# Patient Record
Sex: FEMALE | Race: WHITE
[De-identification: names, ages, dates, MRNs, and addresses within clinical notes are randomized per-mention and may not be internally consistent; named-entity substitution may affect disease eponyms.]

---

## 2020-08-16 ENCOUNTER — HOSPITAL ENCOUNTER (EMERGENCY)
Dept: HOSPITAL 26 - MED | Age: 40
Discharge: HOME | End: 2020-08-16
Payer: COMMERCIAL

## 2020-08-16 VITALS — SYSTOLIC BLOOD PRESSURE: 133 MMHG | DIASTOLIC BLOOD PRESSURE: 77 MMHG

## 2020-08-16 VITALS — DIASTOLIC BLOOD PRESSURE: 77 MMHG | SYSTOLIC BLOOD PRESSURE: 113 MMHG

## 2020-08-16 VITALS — BODY MASS INDEX: 21.98 KG/M2 | HEIGHT: 68 IN | WEIGHT: 145 LBS

## 2020-08-16 DIAGNOSIS — R06.02: Primary | ICD-10-CM

## 2020-08-16 DIAGNOSIS — R20.0: ICD-10-CM

## 2020-08-16 DIAGNOSIS — N64.4: ICD-10-CM

## 2020-08-16 LAB
ALBUMIN FLD-MCNC: 3.8 G/DL (ref 3.4–5)
ANION GAP SERPL CALCULATED.3IONS-SCNC: 15.5 MMOL/L (ref 8–16)
AST SERPL-CCNC: 10 U/L (ref 15–37)
BARBITURATES UR QL SCN: NEGATIVE NG/ML
BASOPHILS # BLD AUTO: 0.1 K/UL (ref 0–0.22)
BASOPHILS NFR BLD AUTO: 0.8 % (ref 0–2)
BENZODIAZ UR QL SCN: NEGATIVE NG/ML
BILIRUB SERPL-MCNC: 1.5 MG/DL (ref 0–1)
BUN SERPL-MCNC: 7 MG/DL (ref 7–18)
BZE UR QL SCN: NEGATIVE NG/ML
CANNABINOIDS UR QL SCN: NEGATIVE NG/ML
CHLORIDE SERPL-SCNC: 101 MMOL/L (ref 98–107)
CO2 SERPL-SCNC: 25.1 MMOL/L (ref 21–32)
CREAT SERPL-MCNC: 0.7 MG/DL (ref 0.6–1.3)
EOSINOPHIL # BLD AUTO: 0 K/UL (ref 0–0.4)
EOSINOPHIL NFR BLD AUTO: 0.3 % (ref 0–4)
ERYTHROCYTE [DISTWIDTH] IN BLOOD BY AUTOMATED COUNT: 14.9 % (ref 11.6–13.7)
GFR SERPL CREATININE-BSD FRML MDRD: 120 ML/MIN (ref 90–?)
GLUCOSE SERPL-MCNC: 94 MG/DL (ref 74–106)
HCT VFR BLD AUTO: 42.1 % (ref 36–48)
HGB BLD-MCNC: 13.6 G/DL (ref 12–16)
KETONES TITR SERPL: NEGATIVE {TITER}
LIPASE SERPL-CCNC: 228 U/L (ref 73–393)
LYMPHOCYTES # BLD AUTO: 2.2 K/UL (ref 2.5–16.5)
LYMPHOCYTES NFR BLD AUTO: 21.2 % (ref 20.5–51.1)
MCH RBC QN AUTO: 28 PG (ref 27–31)
MCHC RBC AUTO-ENTMCNC: 32 G/DL (ref 33–37)
MCV RBC AUTO: 85.8 FL (ref 80–94)
MONOCYTES # BLD AUTO: 0.9 K/UL (ref 0.8–1)
MONOCYTES NFR BLD AUTO: 8.3 % (ref 1.7–9.3)
NEUTROPHILS # BLD AUTO: 7.2 K/UL (ref 1.8–7.7)
NEUTROPHILS NFR BLD AUTO: 69.4 % (ref 42.2–75.2)
OPIATES UR QL SCN: NEGATIVE NG/ML
PCP UR QL SCN: NEGATIVE NG/ML
PLATELET # BLD AUTO: 215 K/UL (ref 140–450)
POTASSIUM SERPL-SCNC: 3.6 MMOL/L (ref 3.5–5.1)
RBC # BLD AUTO: 4.91 MIL/UL (ref 4.2–5.4)
SODIUM SERPL-SCNC: 138 MMOL/L (ref 136–145)
TSH SERPL DL<=0.05 MIU/L-ACNC: 0.57 UIU/ML (ref 0.34–3.74)
WBC # BLD AUTO: 10.4 K/UL (ref 4.8–10.8)

## 2020-08-16 PROCEDURE — 85379 FIBRIN DEGRADATION QUANT: CPT

## 2020-08-16 PROCEDURE — 81002 URINALYSIS NONAUTO W/O SCOPE: CPT

## 2020-08-16 PROCEDURE — 83690 ASSAY OF LIPASE: CPT

## 2020-08-16 PROCEDURE — 81025 URINE PREGNANCY TEST: CPT

## 2020-08-16 PROCEDURE — 71250 CT THORAX DX C-: CPT

## 2020-08-16 PROCEDURE — 80053 COMPREHEN METABOLIC PANEL: CPT

## 2020-08-16 PROCEDURE — 74176 CT ABD & PELVIS W/O CONTRAST: CPT

## 2020-08-16 PROCEDURE — 84484 ASSAY OF TROPONIN QUANT: CPT

## 2020-08-16 PROCEDURE — 82803 BLOOD GASES ANY COMBINATION: CPT

## 2020-08-16 PROCEDURE — 82009 KETONE BODYS QUAL: CPT

## 2020-08-16 PROCEDURE — 84443 ASSAY THYROID STIM HORMONE: CPT

## 2020-08-16 PROCEDURE — 36600 WITHDRAWAL OF ARTERIAL BLOOD: CPT

## 2020-08-16 PROCEDURE — 85025 COMPLETE CBC W/AUTO DIFF WBC: CPT

## 2020-08-16 PROCEDURE — 80305 DRUG TEST PRSMV DIR OPT OBS: CPT

## 2020-08-16 PROCEDURE — 96360 HYDRATION IV INFUSION INIT: CPT

## 2020-08-16 PROCEDURE — 36415 COLL VENOUS BLD VENIPUNCTURE: CPT

## 2020-08-16 PROCEDURE — 99285 EMERGENCY DEPT VISIT HI MDM: CPT

## 2020-08-16 NOTE — NUR
39 Y/O FEMALE C/O GEN WEAKNESS/ FATIGUE/ N/V/D/ SOB/CHEST PAIN X1 MONTH. PT 
STATES SHE HAS TESTED NEGATIVE FOR COVID 4 TIMES, AND HER PCP IS NOT TAKING 
APPOINTMENTS. RESP EVEN AND UNLABORED. SP02 97% RA. NO DISTRESS NOTED AT THIS 
TIME. SKIN WARM/DRY. AAOX4. CAP REFILL <3. PT STATES SHE HAS HAD SIGNIFICANT 
AMOUNT OF WEIGHTLOSS THIS MONTH DUE TO LOSS OF APPETITE. AMBULATORY WITH STEADY 
GAIT. 

DENIES PMH

NKA

## 2020-08-20 NOTE — NUR
Covid results received from lab. Results = NOT DETECTED. Hard copy requested 
from lab and placed in infection controls mailbox.

## 2020-10-19 ENCOUNTER — HOSPITAL ENCOUNTER (EMERGENCY)
Dept: HOSPITAL 26 - MED | Age: 40
Discharge: HOME | End: 2020-10-19
Payer: COMMERCIAL

## 2020-10-19 VITALS — WEIGHT: 170 LBS | HEIGHT: 68 IN | BODY MASS INDEX: 25.76 KG/M2

## 2020-10-19 VITALS — DIASTOLIC BLOOD PRESSURE: 87 MMHG | SYSTOLIC BLOOD PRESSURE: 128 MMHG

## 2020-10-19 VITALS — DIASTOLIC BLOOD PRESSURE: 94 MMHG | SYSTOLIC BLOOD PRESSURE: 131 MMHG

## 2020-10-19 DIAGNOSIS — R42: Primary | ICD-10-CM

## 2020-10-19 DIAGNOSIS — E03.9: ICD-10-CM

## 2020-10-19 DIAGNOSIS — E87.6: ICD-10-CM

## 2020-10-19 DIAGNOSIS — R53.82: ICD-10-CM

## 2020-10-19 LAB
ALBUMIN FLD-MCNC: 3.4 G/DL (ref 3.4–5)
ANION GAP SERPL CALCULATED.3IONS-SCNC: 13.7 MMOL/L (ref 8–16)
AST SERPL-CCNC: 19 U/L (ref 15–37)
BASOPHILS # BLD AUTO: 0.1 K/UL (ref 0–0.22)
BASOPHILS NFR BLD AUTO: 0.7 % (ref 0–2)
BILIRUB SERPL-MCNC: 0.7 MG/DL (ref 0–1)
BUN SERPL-MCNC: 6 MG/DL (ref 7–18)
CHLORIDE SERPL-SCNC: 103 MMOL/L (ref 98–107)
CO2 SERPL-SCNC: 25.7 MMOL/L (ref 21–32)
CREAT SERPL-MCNC: 0.6 MG/DL (ref 0.6–1.3)
EOSINOPHIL # BLD AUTO: 0 K/UL (ref 0–0.4)
EOSINOPHIL NFR BLD AUTO: 0.3 % (ref 0–4)
ERYTHROCYTE [DISTWIDTH] IN BLOOD BY AUTOMATED COUNT: 15.4 % (ref 11.6–13.7)
GFR SERPL CREATININE-BSD FRML MDRD: 143 ML/MIN (ref 90–?)
GLUCOSE SERPL-MCNC: 108 MG/DL (ref 74–106)
HCT VFR BLD AUTO: 39.1 % (ref 36–48)
HGB BLD-MCNC: 12.9 G/DL (ref 12–16)
LYMPHOCYTES # BLD AUTO: 1.7 K/UL (ref 2.5–16.5)
LYMPHOCYTES NFR BLD AUTO: 18.6 % (ref 20.5–51.1)
MCH RBC QN AUTO: 28 PG (ref 27–31)
MCHC RBC AUTO-ENTMCNC: 33 G/DL (ref 33–37)
MCV RBC AUTO: 83.4 FL (ref 80–94)
MONOCYTES # BLD AUTO: 0.7 K/UL (ref 0.8–1)
MONOCYTES NFR BLD AUTO: 8.4 % (ref 1.7–9.3)
NEUTROPHILS # BLD AUTO: 6.5 K/UL (ref 1.8–7.7)
NEUTROPHILS NFR BLD AUTO: 72 % (ref 42.2–75.2)
PLATELET # BLD AUTO: 187 K/UL (ref 140–450)
POTASSIUM SERPL-SCNC: 3.4 MMOL/L (ref 3.5–5.1)
PROTHROMBIN TIME: 10.3 SECS (ref 10.8–13.4)
RBC # BLD AUTO: 4.69 MIL/UL (ref 4.2–5.4)
SODIUM SERPL-SCNC: 139 MMOL/L (ref 136–145)
WBC # BLD AUTO: 9 K/UL (ref 4.8–10.8)

## 2020-10-19 NOTE — NUR
EKG PERFORMED AT BEDSIDE. PATIENT COVERED IN GOWN DURING PROCEDURE EKG READS 
SINUS RHYTHM WITH A RATE OF 76.

## 2020-10-19 NOTE — NUR
39 Y/O FEMALE PRESENTED TO THE ED C/O HERNANDEZ PAIN 5/10, DIZZINESS, LETHARGY AND 
NAUSEA X1 DAY. PT DENIES VOMITING. PT STATES TAKING HER SON'S HYPERTENSION 
MEDICATION OF LOSARTAN X2 HOURS. PT STATES TAKING DULOXETINE 30 MG DAILY X2 
MONTHS. PT TOOK LEVOTHYROXINE TODAY. PERRLA. PT IS NOT IN ANY ACUTE DISTRESS AT 
THIS TIME. PT IS CONNECTED TO CARDIAC MONITOR. PT IS LAYING DOWN IN THE BED, 
BED IS IN LOWEST POSITION, BED IS LOCKED. 



PMH: HYPOTHYROIDISM X 7 YEARS

NKA

## 2021-12-17 ENCOUNTER — HOSPITAL ENCOUNTER (OUTPATIENT)
Dept: HOSPITAL 26 - MDS | Age: 41
Discharge: HOME | End: 2021-12-17
Attending: INTERNAL MEDICINE
Payer: COMMERCIAL

## 2021-12-17 VITALS — BODY MASS INDEX: 25.48 KG/M2 | WEIGHT: 172 LBS | HEIGHT: 69 IN

## 2021-12-17 DIAGNOSIS — K29.70: ICD-10-CM

## 2021-12-17 DIAGNOSIS — R10.13: Primary | ICD-10-CM

## 2021-12-17 DIAGNOSIS — Z79.899: ICD-10-CM

## 2021-12-17 PROCEDURE — 81025 URINE PREGNANCY TEST: CPT

## 2021-12-17 PROCEDURE — 86677 HELICOBACTER PYLORI ANTIBODY: CPT

## 2021-12-17 PROCEDURE — 43239 EGD BIOPSY SINGLE/MULTIPLE: CPT

## 2021-12-17 PROCEDURE — 36415 COLL VENOUS BLD VENIPUNCTURE: CPT
